# Patient Record
Sex: MALE | Race: WHITE | NOT HISPANIC OR LATINO | Employment: FULL TIME | URBAN - METROPOLITAN AREA
[De-identification: names, ages, dates, MRNs, and addresses within clinical notes are randomized per-mention and may not be internally consistent; named-entity substitution may affect disease eponyms.]

---

## 2017-01-27 ENCOUNTER — OFFICE VISIT (OUTPATIENT)
Dept: MEDICAL GROUP | Facility: MEDICAL CENTER | Age: 42
End: 2017-01-27
Payer: COMMERCIAL

## 2017-01-27 VITALS
BODY MASS INDEX: 31.37 KG/M2 | WEIGHT: 207 LBS | DIASTOLIC BLOOD PRESSURE: 76 MMHG | TEMPERATURE: 97.7 F | HEART RATE: 76 BPM | OXYGEN SATURATION: 97 % | SYSTOLIC BLOOD PRESSURE: 134 MMHG | HEIGHT: 68 IN

## 2017-01-27 DIAGNOSIS — Z00.00 PREVENTATIVE HEALTH CARE: Chronic | ICD-10-CM

## 2017-01-27 DIAGNOSIS — L98.9 SKIN LESION: ICD-10-CM

## 2017-01-27 PROCEDURE — 99213 OFFICE O/P EST LOW 20 MIN: CPT | Performed by: INTERNAL MEDICINE

## 2017-01-27 ASSESSMENT — PATIENT HEALTH QUESTIONNAIRE - PHQ9: CLINICAL INTERPRETATION OF PHQ2 SCORE: 0

## 2017-01-27 NOTE — PROGRESS NOTES
Subjective:      Cliff Gu is a 42 y.o. 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  0 - not at all  Feeling down, depressed , or hopeless? 0 - not at all  Patient Health Questionnaire Score: 0     If depressive symptoms identified deferred to follow up visit unless specifically addressed in assesment and plan.      Current Outpatient Prescriptions   Medication Sig Dispense Refill   • albuterol (VENTOLIN OR PROVENTIL) 108 (90 BASE) MCG/ACT Aero Soln inhalation aerosol Inhale 2 Puffs by mouth every 6 hours as needed for Shortness of Breath. 8.5 g 1     No current facility-administered medications for this visit.     Patient Active Problem List    Diagnosis Date Noted   • Status post medial meniscus repair 05/17/2015   • Preventative health care 10/21/2011   • Left knee pain 09/23/2009   • Dyslipidemia 09/23/2009   • Sleep apnea 09/23/2009   • Allergic rhinitis 09/23/2009   • Thyroglossal duct cyst 09/23/2009           Thyroglossal duct cyst    Sleep apnea  9/07 pma probably had sleep study positive for sleep apnea and declined treatment I do not have that result    Preventative health  11/6/10 tdap  10/11 vit d 24    Meniscus tear  5/5/15 x-ray right knee probable mild patellofemoral compartment osteoarthritis  5/17/15 MRI right knee large medial meniscus posterior horn tear, large para  "meniscal cyst 3.3 x 1.7 x 0.6 cm  5/28/15  orthopedic note, right meniscus tear, scheduled for repair  6/5/15  orthopedic operative note partial arthroscopy with medial meniscectomy right knee    Left knee pain  6/08 MRI left knee medial femoral condylar articular cartilage irregularity, no full thickness loss    Dyslipidemia  7/07 chol 225,trig 238,hdl 32,ldl 142  9/09 chol 226,trig 212,hdl 31,ldl 153  10/11 chol 223,trig 161,hdl 27,ldl 164  6/2/15 chol 235,trig 255,hdl 38,ldl 146    Allergic rhinitis  Last kenalog shot june 2008 9/09 kenalog 40 mg IM  9/13 kenalog 40 mg IM                ROS       Objective:     /76 mmHg  Pulse 76  Temp(Src) 36.5 °C (97.7 °F)  Ht 1.727 m (5' 8\")  Wt 93.895 kg (207 lb)  BMI 31.48 kg/m2  SpO2 97%     Physical Exam   Constitutional: He appears well-developed and well-nourished. No distress.   HENT:   Head: Normocephalic and atraumatic.   Eyes: Conjunctivae are normal. Right eye exhibits no discharge. Left eye exhibits no discharge.   Neck: No JVD present. No thyromegaly present.   Cardiovascular: Normal rate, regular rhythm and normal heart sounds.    Pulmonary/Chest: Effort normal. No respiratory distress. He has no wheezes.   Abdominal: He exhibits no distension.   Musculoskeletal: He exhibits no edema.   Skin: Skin is warm. He is not diaphoretic. No erythema.   Psychiatric: He has a normal mood and affect. His behavior is normal.   Nursing note and vitals reviewed.    Left frontal scalp flap tannish brown lesion 1 x 1 cm circular lesion          Assessment/Plan:        Assessment  #1 left frontal scalp 1 x 1 cm circular flat lesion not suspicious    #2 obesity BMI 31.4      Plan   #1 monitor skin lesion, notify us for changes in size, color, texture    #2 wear hat at all times outdoors    #3 use sunscreen    #4 diet, exercise, weight loss discussed, does not require nutrition consultation at this time    #5 follow-up for annual exam         "

## 2017-04-25 NOTE — MR AVS SNAPSHOT
"        Cliff Gu   2017 1:20 PM   Office Visit   MRN: [id number]    Department:  [facility]   Dept Phone:  [phone]    Description:  Male : 1975   Provider:  You Flynn M.D.           Allergies as of 2017     Allergen Noted Reactions    Other Environmental 2015       Seasonal grasses weeds      You were diagnosed with     Preventative health care   [[id number]]         Vital Signs     Blood Pressure Pulse Temperature Height Weight Body Mass Index    134/76 mmHg 76 36.5 °C (97.7 °F) 1.727 m (5' 8\") 93.895 kg (207 lb) 31.48 kg/m2    Oxygen Saturation Smoking Status                97% Never Smoker           Basic Information     Date Of Birth Sex Race Ethnicity Preferred Language    1975 Male White Non- English      Problem List              ICD-10-CM Priority Class Noted - Resolved    Left knee pain M25.562   2009 - Present    Dyslipidemia (Chronic) E78.5   2009 - Present    Sleep apnea G47.30   2009 - Present    Allergic rhinitis (Chronic)    2009 - Present    Thyroglossal duct cyst Q89.2   2009 - Present    Preventative health care (Chronic) Z00.00   10/21/2011 - Present    Status post medial meniscus repair (Chronic) Z98.890   2015 - Present      Health Maintenance        Date Due Completion Dates    IMM INFLUENZA (1) 2016 10/20/2011    IMM DTaP/Tdap/Td Vaccine (2 - Td) 2020            Current Immunizations     Influenza TIV (IM) 10/20/2011    Tdap Vaccine 2010 11:26 AM      Below and/or attached are the medications your provider expects you to take. Review all of your home medications and newly ordered medications with your provider and/or pharmacist. Follow medication instructions as directed by your provider and/or pharmacist. Please keep your medication list with you and share with your provider. Update the information when medications are discontinued, doses are changed, or new medications (including " over-the-counter products) are added; and carry medication information at all times in the event of emergency situations     Allergies:  OTHER ENVIRONMENTAL - (reactions not documented)               Medications  Valid as of: January 27, 2017 -  1:41 PM    Generic Name Brand Name Tablet Size Instructions for use    Albuterol Sulfate (Aero Soln) albuterol 108 (90 BASE) MCG/ACT Inhale 2 Puffs by mouth every 6 hours as needed for Shortness of Breath.        .                 Medicines prescribed today were sent to:     [facility] [address] [place] [address] - [id number] [facility]    [address] [facility] [place] [address]    Phone: [phone] Fax: [phone]    Open 24 Hours?: No      Medication refill instructions:       If your prescription bottle indicates you have medication refills left, it is not necessary to call your provider’s office. Please contact your pharmacy and they will refill your medication.    If your prescription bottle indicates you do not have any refills left, you may request refills at any time through one of the following ways: The online DaisyBill system (except Urgent Care), by calling your provider’s office, or by asking your pharmacy to contact your provider’s office with a refill request. Medication refills are processed only during regular business hours and may not be available until the next business day. Your provider may request additional information or to have a follow-up visit with you prior to refilling your medication.   *Please Note: Medication refills are assigned a new Rx number when refilled electronically. Your pharmacy may indicate that no refills were authorized even though a new prescription for the same medication is available at the pharmacy. Please request the medicine by name with the pharmacy before contacting your provider for a refill.        Your To Do List     Future Labs[facility]Procedures [facility] WITH  DIFFERENTIAL  As directed 1/28/2018    COMP METABOLIC PANEL  As directed 1/28/2018    LIPID PROFILE  As directed 1/28/2018    TSH  As directed 1/28/2018    URINALYSIS,CULTURE IF INDICATED  As directed 1/28/2018    VITAMIN D,25 HYDROXY  As directed 1/28/2018      Other Notes About Your Plan           Need all labs        Thyroglossal duct cyst    Sleep apnea  9/07 pma probably had sleep study positive for sleep apnea and declined treatment I do not have that result    Preventative health  11/6/10 tdap  10/11 vit d 24    Meniscus tear  5/5/15 x-ray right knee probable mild patellofemoral compartment osteoarthritis  5/17/15 MRI right knee large medial meniscus posterior horn tear, large para meniscal cyst 3.3 x 1.7 x 0.6 cm  5/28/15  orthopedic note, right meniscus tear, scheduled for repair  6/5/15  orthopedic operative note partial arthroscopy with medial meniscectomy right knee    Left knee pain  6/08 MRI left knee medial femoral condylar articular cartilage irregularity, no full thickness loss    Dyslipidemia  7/07 chol 225,trig 238,hdl 32,ldl 142  9/09 chol 226,trig 212,hdl 31,ldl 153  10/11 chol 223,trig 161,hdl 27,ldl 164  6/2/15 chol 235,trig 255,hdl 38,ldl 146    Allergic rhinitis  Last kenalog shot june 2008 9/09 kenalog 40 mg IM  9/13 kenalog 40 mg IM                         Astaro Access Code: [email or web address]  Expires: 2/26/2017  1:41 PM    Astaro  A secure, online tool to manage your health information     amiandos Astaro® is a secure, online tool that connects you to your personalized health information from the privacy of your home -- day or night - making it very easy for you to manage your healthcare. Once the activation process is completed, you can even access your medical information using the Astaro emil, which is available for free in the Apple Emil store or Google Play store.     Astaro provides the following levels of access (as shown below):   My Chart Features    [facility] Doctor [facility]  Specialists [facility]  Urgent  Care Non-[facility]  Primary Care  Doctor   Email your healthcare team securely and privately 24/7 X X X    Manage appointments: schedule your next appointment; view details of past/upcoming appointments X      Request prescription refills. X      View recent personal medical records, including lab and immunizations X X X X   View health record, including health history, allergies, medications X X X X   Read reports about your outpatient visits, procedures, consult and ER notes X X X X   See your discharge summary, which is a recap of your hospital and/or ER visit that includes your diagnosis, lab results, and care plan. X X       How to register for enVerid:  1. Go to  [email or web address].  2. Click on the Sign Up Now box, which takes you to the New Member Sign Up page. You will need to provide the following information:  a. Enter your enVerid Access Code exactly as it appears at the top of this page. (You will not need to use this code after you’ve completed the sign-up process. If you do not sign up before the expiration date, you must request a new code.)   b. Enter your date of birth.   c. Enter your home email address.   d. Click Submit, and follow the next screen’s instructions.  3. Create a enVerid ID. This will be your enVerid login ID and cannot be changed, so think of one that is secure and easy to remember.  4. Create a enVerid password. You can change your password at any time.  5. Enter your Password Reset Question and Answer. This can be used at a later time if you forget your password.   6. Enter your e-mail address. This allows you to receive e-mail notifications when new information is available in enVerid.  7. Click Sign Up. You can now view your health information.    For assistance activating your enVerid account, call [phone]         detailed exam PERRL/conjunctiva clear/EOMI

## 2019-02-28 ENCOUNTER — OFFICE VISIT (OUTPATIENT)
Dept: URGENT CARE | Facility: PHYSICIAN GROUP | Age: 44
End: 2019-02-28

## 2019-02-28 VITALS
TEMPERATURE: 98.1 F | BODY MASS INDEX: 34.53 KG/M2 | WEIGHT: 220 LBS | DIASTOLIC BLOOD PRESSURE: 80 MMHG | OXYGEN SATURATION: 96 % | SYSTOLIC BLOOD PRESSURE: 122 MMHG | HEART RATE: 75 BPM | HEIGHT: 67 IN | RESPIRATION RATE: 16 BRPM

## 2019-02-28 DIAGNOSIS — Z02.4 ENCOUNTER FOR COMMERCIAL DRIVING LICENSE (CDL) EXAM: Primary | ICD-10-CM

## 2019-02-28 PROCEDURE — 7100 PR DOT PHYSICAL: Performed by: NURSE PRACTITIONER

## 2019-02-28 NOTE — PROGRESS NOTES
"Subjective:     Cliff Gu is a 44 y.o. male who presents for Annual Exam (DOT )       Patient presents for a DOT physical exam. Refer to paper documentation scanned into electronic health record under \"Media.\"    PMH:  has a past medical history of High cholesterol and Unspecified disorder of thyroid.    MEDS:   Current Outpatient Prescriptions:   •  albuterol (VENTOLIN OR PROVENTIL) 108 (90 BASE) MCG/ACT Aero Soln inhalation aerosol, Inhale 2 Puffs by mouth every 6 hours as needed for Shortness of Breath. (Patient not taking: Reported on 2/28/2019), Disp: 8.5 g, Rfl: 1    ALLERGIES:   Allergies   Allergen Reactions   • Other Environmental      Seasonal grasses weeds     SURGHX:   Past Surgical History:   Procedure Laterality Date   • KNEE ARTHROSCOPY Right 6/5/2015    Procedure: KNEE ARTHROSCOPY;  Surgeon: Noah Howell M.D.;  Location: SURGERY [facility];  Service:    • PB EXCIS THYROID DUCT CYST/SINUS  1982     SOCHX:  reports that he has never smoked. He has never used smokeless tobacco. He reports that he drinks about 1.8 oz of alcohol per week . He reports that he does not use drugs.     FH: Reviewed with patient, not pertinent to this visit.     ROS reviewed. Refer to paper documentation scanned into electronic health record under \"Media.\"    Objective:     /80   Pulse 75   Temp 36.7 °C (98.1 °F) (Temporal)   Resp 16   Ht 1.702 m (5' 7\")   Wt 99.8 kg (220 lb)   SpO2 96%   BMI 34.46 kg/m²     Physical exam WNL. 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 Refer to paper documentation scanned into electronic health record under \"Media.\"  "

## 2022-03-31 ENCOUNTER — OFFICE VISIT (OUTPATIENT)
Dept: MEDICAL GROUP | Facility: MEDICAL CENTER | Age: 47
End: 2022-03-31
Payer: COMMERCIAL

## 2022-03-31 ENCOUNTER — HOSPITAL ENCOUNTER (OUTPATIENT)
Dept: LAB | Facility: MEDICAL CENTER | Age: 47
End: 2022-03-31
Attending: PHYSICIAN ASSISTANT
Payer: COMMERCIAL

## 2022-03-31 VITALS
HEART RATE: 68 BPM | TEMPERATURE: 98.3 F | WEIGHT: 236 LBS | DIASTOLIC BLOOD PRESSURE: 90 MMHG | SYSTOLIC BLOOD PRESSURE: 142 MMHG | OXYGEN SATURATION: 97 % | BODY MASS INDEX: 35.77 KG/M2 | HEIGHT: 68 IN

## 2022-03-31 DIAGNOSIS — Z80.0 FAMILY HISTORY OF COLON CANCER: ICD-10-CM

## 2022-03-31 DIAGNOSIS — Z00.00 HEALTHCARE MAINTENANCE: ICD-10-CM

## 2022-03-31 DIAGNOSIS — Z23 NEED FOR VACCINATION: ICD-10-CM

## 2022-03-31 DIAGNOSIS — E78.5 DYSLIPIDEMIA: ICD-10-CM

## 2022-03-31 DIAGNOSIS — R55 NEAR SYNCOPE: ICD-10-CM

## 2022-03-31 DIAGNOSIS — I10 PRIMARY HYPERTENSION: ICD-10-CM

## 2022-03-31 LAB
25(OH)D3 SERPL-MCNC: 29 NG/ML (ref 30–100)
ALBUMIN SERPL BCP-MCNC: 4.6 G/DL (ref 3.2–4.9)
ALBUMIN/GLOB SERPL: 1.7 G/DL
ALP SERPL-CCNC: 66 U/L (ref 30–99)
ALT SERPL-CCNC: 24 U/L (ref 2–50)
ANION GAP SERPL CALC-SCNC: 12 MMOL/L (ref 7–16)
AST SERPL-CCNC: 25 U/L (ref 12–45)
BASOPHILS # BLD AUTO: 1 % (ref 0–1.8)
BASOPHILS # BLD: 0.07 K/UL (ref 0–0.12)
BILIRUB SERPL-MCNC: 0.4 MG/DL (ref 0.1–1.5)
BUN SERPL-MCNC: 10 MG/DL (ref 8–22)
CALCIUM SERPL-MCNC: 9.6 MG/DL (ref 8.4–10.2)
CHLORIDE SERPL-SCNC: 103 MMOL/L (ref 96–112)
CHOLEST SERPL-MCNC: 296 MG/DL (ref 100–199)
CO2 SERPL-SCNC: 25 MMOL/L (ref 20–33)
CREAT SERPL-MCNC: 0.93 MG/DL (ref 0.5–1.4)
EOSINOPHIL # BLD AUTO: 0.28 K/UL (ref 0–0.51)
EOSINOPHIL NFR BLD: 4 % (ref 0–6.9)
ERYTHROCYTE [DISTWIDTH] IN BLOOD BY AUTOMATED COUNT: 41.1 FL (ref 35.9–50)
EST. AVERAGE GLUCOSE BLD GHB EST-MCNC: 108 MG/DL
FASTING STATUS PATIENT QL REPORTED: NORMAL
GFR SERPLBLD CREATININE-BSD FMLA CKD-EPI: 101 ML/MIN/1.73 M 2
GLOBULIN SER CALC-MCNC: 2.7 G/DL (ref 1.9–3.5)
GLUCOSE SERPL-MCNC: 99 MG/DL (ref 65–99)
HBA1C MFR BLD: 5.4 % (ref 4–5.6)
HCT VFR BLD AUTO: 46.6 % (ref 42–52)
HDLC SERPL-MCNC: 31 MG/DL
HGB BLD-MCNC: 16.3 G/DL (ref 14–18)
IMM GRANULOCYTES # BLD AUTO: 0.02 K/UL (ref 0–0.11)
IMM GRANULOCYTES NFR BLD AUTO: 0.3 % (ref 0–0.9)
LDLC SERPL CALC-MCNC: 229 MG/DL
LYMPHOCYTES # BLD AUTO: 2.32 K/UL (ref 1–4.8)
LYMPHOCYTES NFR BLD: 32.8 % (ref 22–41)
MCH RBC QN AUTO: 30.6 PG (ref 27–33)
MCHC RBC AUTO-ENTMCNC: 35 G/DL (ref 33.7–35.3)
MCV RBC AUTO: 87.6 FL (ref 81.4–97.8)
MONOCYTES # BLD AUTO: 0.58 K/UL (ref 0–0.85)
MONOCYTES NFR BLD AUTO: 8.2 % (ref 0–13.4)
NEUTROPHILS # BLD AUTO: 3.81 K/UL (ref 1.82–7.42)
NEUTROPHILS NFR BLD: 53.7 % (ref 44–72)
NRBC # BLD AUTO: 0 K/UL
NRBC BLD-RTO: 0 /100 WBC
PLATELET # BLD AUTO: 278 K/UL (ref 164–446)
PMV BLD AUTO: 9.1 FL (ref 9–12.9)
POTASSIUM SERPL-SCNC: 4.2 MMOL/L (ref 3.6–5.5)
PROT SERPL-MCNC: 7.3 G/DL (ref 6–8.2)
PSA SERPL-MCNC: 1.26 NG/ML (ref 0–4)
RBC # BLD AUTO: 5.32 M/UL (ref 4.7–6.1)
SODIUM SERPL-SCNC: 140 MMOL/L (ref 135–145)
TRIGL SERPL-MCNC: 180 MG/DL (ref 0–149)
TSH SERPL DL<=0.005 MIU/L-ACNC: 2.32 UIU/ML (ref 0.38–5.33)
WBC # BLD AUTO: 7.1 K/UL (ref 4.8–10.8)

## 2022-03-31 PROCEDURE — 85025 COMPLETE CBC W/AUTO DIFF WBC: CPT

## 2022-03-31 PROCEDURE — 99204 OFFICE O/P NEW MOD 45 MIN: CPT | Mod: 25 | Performed by: PHYSICIAN ASSISTANT

## 2022-03-31 PROCEDURE — 80061 LIPID PANEL: CPT

## 2022-03-31 PROCEDURE — 36415 COLL VENOUS BLD VENIPUNCTURE: CPT

## 2022-03-31 PROCEDURE — 84443 ASSAY THYROID STIM HORMONE: CPT

## 2022-03-31 PROCEDURE — 80053 COMPREHEN METABOLIC PANEL: CPT

## 2022-03-31 PROCEDURE — 83036 HEMOGLOBIN GLYCOSYLATED A1C: CPT

## 2022-03-31 PROCEDURE — 82306 VITAMIN D 25 HYDROXY: CPT

## 2022-03-31 PROCEDURE — 90471 IMMUNIZATION ADMIN: CPT | Performed by: PHYSICIAN ASSISTANT

## 2022-03-31 PROCEDURE — 84153 ASSAY OF PSA TOTAL: CPT

## 2022-03-31 PROCEDURE — 90715 TDAP VACCINE 7 YRS/> IM: CPT | Performed by: PHYSICIAN ASSISTANT

## 2022-03-31 SDOH — ECONOMIC STABILITY: TRANSPORTATION INSECURITY
IN THE PAST 12 MONTHS, HAS THE LACK OF TRANSPORTATION KEPT YOU FROM MEDICAL APPOINTMENTS OR FROM GETTING MEDICATIONS?: NO

## 2022-03-31 SDOH — ECONOMIC STABILITY: FOOD INSECURITY: WITHIN THE PAST 12 MONTHS, YOU WORRIED THAT YOUR FOOD WOULD RUN OUT BEFORE YOU GOT MONEY TO BUY MORE.: SOMETIMES TRUE

## 2022-03-31 SDOH — ECONOMIC STABILITY: FOOD INSECURITY: WITHIN THE PAST 12 MONTHS, THE FOOD YOU BOUGHT JUST DIDN'T LAST AND YOU DIDN'T HAVE MONEY TO GET MORE.: NEVER TRUE

## 2022-03-31 SDOH — ECONOMIC STABILITY: HOUSING INSECURITY
IN THE LAST 12 MONTHS, WAS THERE A TIME WHEN YOU DID NOT HAVE A STEADY PLACE TO SLEEP OR SLEPT IN A SHELTER (INCLUDING NOW)?: NO

## 2022-03-31 SDOH — HEALTH STABILITY: MENTAL HEALTH
STRESS IS WHEN SOMEONE FEELS TENSE, NERVOUS, ANXIOUS, OR CAN'T SLEEP AT NIGHT BECAUSE THEIR MIND IS TROUBLED. HOW STRESSED ARE YOU?: TO SOME EXTENT

## 2022-03-31 SDOH — ECONOMIC STABILITY: INCOME INSECURITY: HOW HARD IS IT FOR YOU TO PAY FOR THE VERY BASICS LIKE FOOD, HOUSING, MEDICAL CARE, AND HEATING?: SOMEWHAT HARD

## 2022-03-31 SDOH — HEALTH STABILITY: PHYSICAL HEALTH: ON AVERAGE, HOW MANY DAYS PER WEEK DO YOU ENGAGE IN MODERATE TO STRENUOUS EXERCISE (LIKE A BRISK WALK)?: 6 DAYS

## 2022-03-31 SDOH — HEALTH STABILITY: PHYSICAL HEALTH: ON AVERAGE, HOW MANY MINUTES DO YOU ENGAGE IN EXERCISE AT THIS LEVEL?: 30 MIN

## 2022-03-31 SDOH — ECONOMIC STABILITY: INCOME INSECURITY: IN THE LAST 12 MONTHS, WAS THERE A TIME WHEN YOU WERE NOT ABLE TO PAY THE MORTGAGE OR RENT ON TIME?: NO

## 2022-03-31 SDOH — ECONOMIC STABILITY: HOUSING INSECURITY: IN THE LAST 12 MONTHS, HOW MANY PLACES HAVE YOU LIVED?: 2

## 2022-03-31 SDOH — ECONOMIC STABILITY: TRANSPORTATION INSECURITY
IN THE PAST 12 MONTHS, HAS LACK OF TRANSPORTATION KEPT YOU FROM MEETINGS, WORK, OR FROM GETTING THINGS NEEDED FOR DAILY LIVING?: NO

## 2022-03-31 SDOH — ECONOMIC STABILITY: TRANSPORTATION INSECURITY
IN THE PAST 12 MONTHS, HAS LACK OF RELIABLE TRANSPORTATION KEPT YOU FROM MEDICAL APPOINTMENTS, MEETINGS, WORK OR FROM GETTING THINGS NEEDED FOR DAILY LIVING?: NO

## 2022-03-31 ASSESSMENT — SOCIAL DETERMINANTS OF HEALTH (SDOH)
HOW OFTEN DO YOU GET TOGETHER WITH FRIENDS OR RELATIVES?: ONCE A WEEK
WITHIN THE PAST 12 MONTHS, YOU WORRIED THAT YOUR FOOD WOULD RUN OUT BEFORE YOU GOT THE MONEY TO BUY MORE: SOMETIMES TRUE
HOW OFTEN DO YOU ATTEND CHURCH OR RELIGIOUS SERVICES?: 1 TO 4 TIMES PER YEAR
HOW MANY DRINKS CONTAINING ALCOHOL DO YOU HAVE ON A TYPICAL DAY WHEN YOU ARE DRINKING: 1 OR 2
HOW HARD IS IT FOR YOU TO PAY FOR THE VERY BASICS LIKE FOOD, HOUSING, MEDICAL CARE, AND HEATING?: SOMEWHAT HARD
HOW OFTEN DO YOU HAVE SIX OR MORE DRINKS ON ONE OCCASION: NEVER
IN A TYPICAL WEEK, HOW MANY TIMES DO YOU TALK ON THE PHONE WITH FAMILY, FRIENDS, OR NEIGHBORS?: ONCE A WEEK
DO YOU BELONG TO ANY CLUBS OR ORGANIZATIONS SUCH AS CHURCH GROUPS UNIONS, FRATERNAL OR ATHLETIC GROUPS, OR SCHOOL GROUPS?: NO
HOW OFTEN DO YOU HAVE A DRINK CONTAINING ALCOHOL: MONTHLY OR LESS
DO YOU BELONG TO ANY CLUBS OR ORGANIZATIONS SUCH AS CHURCH GROUPS UNIONS, FRATERNAL OR ATHLETIC GROUPS, OR SCHOOL GROUPS?: NO
HOW OFTEN DO YOU ATTENT MEETINGS OF THE CLUB OR ORGANIZATION YOU BELONG TO?: NEVER
HOW OFTEN DO YOU ATTENT MEETINGS OF THE CLUB OR ORGANIZATION YOU BELONG TO?: NEVER
HOW OFTEN DO YOU ATTEND CHURCH OR RELIGIOUS SERVICES?: 1 TO 4 TIMES PER YEAR
HOW OFTEN DO YOU GET TOGETHER WITH FRIENDS OR RELATIVES?: ONCE A WEEK
IN A TYPICAL WEEK, HOW MANY TIMES DO YOU TALK ON THE PHONE WITH FAMILY, FRIENDS, OR NEIGHBORS?: ONCE A WEEK

## 2022-03-31 ASSESSMENT — LIFESTYLE VARIABLES
HOW OFTEN DO YOU HAVE A DRINK CONTAINING ALCOHOL: MONTHLY OR LESS
HOW MANY STANDARD DRINKS CONTAINING ALCOHOL DO YOU HAVE ON A TYPICAL DAY: 1 OR 2
HOW OFTEN DO YOU HAVE SIX OR MORE DRINKS ON ONE OCCASION: NEVER

## 2022-03-31 NOTE — PROGRESS NOTES
"Subjective:   Cliff Gu is a 47 y.o. male here today for establishing care:    HPI    Patient is a pleasant 47-year-old male who comes in to establish care.  Has not been seen for a few years secondary to Covid pandemic.  Reports having a remote history of elevated cholesterol, obesity family history of colon cancer.  Dad had colon cancer diagnosed in his 70s.  Patient denies change in stool or blood or mucus in his stool.  Has noticed in the last 2 weeks he has had 2 episodes where he felt faint and that he was going to black out.  He sat down and symptoms resolved.  He notes that he has been very stressed with the selling of the property and has not been sleeping as well.  Since changing his diet and trying to increase fluids and sleep he has not had episodes occur.  Denies rapid heartbeat, irregular heartbeat sensation chest pain or shortness of breath.  Both episodes occurred while at rest      Current medicines (including changes today)  No current outpatient medications on file.     No current facility-administered medications for this visit.     He  has a past medical history of Allergic rhinitis (9/23/2009), High cholesterol, and Unspecified disorder of thyroid.  Other environmental     Social History and Family History were reviewed and updated.    ROS   No headaches, chest pain, no shortness of breath, abdominal pain, nausea, or vomiting.  All other systems were reviewed and are negative or noted as positive in the HPI.       Objective:     /90 (BP Location: Right arm, Patient Position: Sitting, BP Cuff Size: Large adult)   Pulse 68   Temp 36.8 °C (98.3 °F) (Temporal)   Ht 1.727 m (5' 8\")   Wt 107 kg (236 lb)   SpO2 97%  Body mass index is 35.88 kg/m².     Physical Exam:  Constitutional: ANO x3, no acute distress, well-nourished, well-hydrated   Skin: Warm, dry, good turgor, no rashes in visible areas.  HEENT: Is normocephalic atraumatic, good PERRLA, extraocular movements intact, " TMs and oropharynx are clear with good dentition   Neck: Soft and supple, trachea midline, no masses.  No thyroid megaly or cervical lymphadenopathy noted  Cardiovascular: Regular rate and rhythm.  Normal S1 and 2, no murmurs, rubs or gallops.  No edema noted  Lungs: Clear to auscultation bilaterally.  No wheezes, rales or rhonchi.  Good inspiratory and expiratory breath sounds  Abdomen: Soft, non-tender, no masses.  No hepatosplenomegaly.  Negative Reno's  Psych: Alert and oriented x3, normal affect and mood.  Neuro: Cranial nerves II through XII were assessed and intact.  Normal gait, normal cerebellar function noted  Musculoskeletal: Full range of motion, good strength against resistance    Clinical Course/Lab Analysis:    Pending    Assessment and Plan:   The following treatment plan was discussed.  Signs and symptoms for which to return were discussed with patient at length.  Patient verbalized understanding.    1. Need for vaccination  - Tdap Vaccine =>8YO IM    2. Near syncope  New and unstable: Unclear etiology.  May be related to dehydration or fatigue.  Will order some baseline labs and follow  - Comp Metabolic Panel; Future  - CBC WITH DIFFERENTIAL; Future  - TSH WITH REFLEX TO FT4; Future    3. Dyslipidemia  Chronic and unstable we will order labs.  Patient may need to be started on medication for lipid lowering  - LIPID PANEL    4. Family history of colon cancer  Chronic and unstable  Patient has family history of colon cancer with dad diagnosed in his 70s.  We will still set up for colon cancer screening at 50 or earlier as needed    5. Healthcare maintenance  - PROSTATE SPECIFIC AG SCREENING; Future  - VITAMIN D,25 HYDROXY; Future  - HEMOGLOBIN A1C; Future     7.  Obesity  Patient is changing his diet and lifestyle.  Discussed the importance of this at length      8. Hypertension  New and unstable  Will recheck in two weeks and if still elevated will recommend antihypertensives at next  visit    Followup: 2 weeks    Please note that this dictation was created using voice recognition software. I have made every reasonable attempt to correct obvious errors, but I expect that there are errors of grammar and possibly content that I did not discover before finalizing the note.

## 2022-04-12 ENCOUNTER — OFFICE VISIT (OUTPATIENT)
Dept: MEDICAL GROUP | Facility: MEDICAL CENTER | Age: 47
End: 2022-04-12
Payer: COMMERCIAL

## 2022-04-12 VITALS
SYSTOLIC BLOOD PRESSURE: 134 MMHG | HEIGHT: 68 IN | WEIGHT: 233 LBS | TEMPERATURE: 97.5 F | HEART RATE: 66 BPM | BODY MASS INDEX: 35.31 KG/M2 | OXYGEN SATURATION: 98 % | DIASTOLIC BLOOD PRESSURE: 74 MMHG

## 2022-04-12 DIAGNOSIS — R55 NEAR SYNCOPE: ICD-10-CM

## 2022-04-12 DIAGNOSIS — E66.9 OBESITY (BMI 30-39.9): ICD-10-CM

## 2022-04-12 DIAGNOSIS — Z91.89 OTHER SPECIFIED PERSONAL RISK FACTORS, NOT ELSEWHERE CLASSIFIED: ICD-10-CM

## 2022-04-12 DIAGNOSIS — E78.2 MIXED HYPERLIPIDEMIA: ICD-10-CM

## 2022-04-12 PROCEDURE — 99214 OFFICE O/P EST MOD 30 MIN: CPT | Performed by: PHYSICIAN ASSISTANT

## 2022-04-12 ASSESSMENT — FIBROSIS 4 INDEX: FIB4 SCORE: 0.88

## 2022-04-12 ASSESSMENT — PATIENT HEALTH QUESTIONNAIRE - PHQ9: CLINICAL INTERPRETATION OF PHQ2 SCORE: 0

## 2022-04-12 NOTE — ASSESSMENT & PLAN NOTE
Patient is a pleasant 47-year-old who comes in to go over labs that were drawn at last visit.  Notes that he understands he has high cholesterol and triglycerides.  He is trying to change his diet by doing keto diet.  He is also trying to lose weight.  He adamantly defers on any statin therapy.  Denies family history of heart disease

## 2022-04-12 NOTE — ASSESSMENT & PLAN NOTE
Since increasing fluid intake and getting more sleep he has had no near syncopal episodes since last appointment

## 2022-04-12 NOTE — PROGRESS NOTES
"Subjective:     Chief Complaint   Patient presents with   • Lab Results     Cliff Gu is a 47 y.o. male here today to follow up on:    Mixed hyperlipidemia  Patient is a pleasant 47-year-old who comes in to go over labs that were drawn at last visit.  Notes that he understands he has high cholesterol and triglycerides.  He is trying to change his diet by doing keto diet.  He is also trying to lose weight.  He adamantly defers on any statin therapy.  Denies family history of heart disease    Near syncope  Since increasing fluid intake and getting more sleep he has had no near syncopal episodes since last appointment    Obesity (BMI 30-39.9)  Patient is trying to lose weight via keto diet and exercise       Current medicines (including changes today)  No current outpatient medications on file.     No current facility-administered medications for this visit.     He  has a past medical history of Allergic rhinitis (9/23/2009), Dyslipidemia (9/23/2009), High cholesterol, and Unspecified disorder of thyroid.    ROS  No chest pain, no abdominal pain, no rash.  All other systems reviewed and are negative      Objective:     /74 (BP Location: Right arm, Patient Position: Sitting, BP Cuff Size: Large adult)   Pulse 66   Temp 36.4 °C (97.5 °F) (Temporal)   Ht 1.727 m (5' 8\")   Wt 106 kg (233 lb)   SpO2 98%  Body mass index is 35.43 kg/m².     Physical Exam:  Constitutional: Alert, no distress.  Skin: Warm, dry, good turgor, no rashes in visible areas.  Eye: Equal, round and reactive, conjunctiva clear, lids normal.  ENMT: Lips without lesions, good dentition, oropharynx clear.  Neck: Trachea midline, no masses, no thyromegaly. No cervical or supraclavicular lymphadenopathy.  Respiratory: Unlabored respiratory effort, lungs clear to auscultation, no wheezes, no ronchi.  Cardiovascular: Normal S1, S2, no murmur, no edema.  Abdomen: Soft, non-tender, no masses, no hepatosplenomegaly.  Psych: Alert and " oriented x3, normal affect and mood.        Assessment and Plan:   The following treatment plan was discussed and signs and symptoms for which to return were discussed with patient.  Patient verbalized understanding.    1. Mixed hyperlipidemia  Chronic and unstable.  Discussed with him that his ASCVD is 9.3% my recommendation is to start Crestor 10 mg.  He adamantly defers and understands risks of refusal.  He wants to recheck lipid every 3 months.  He will start exercising and is currently doing keto diet  - CRP HIGH SENSITIVE (CARDIAC); Future  - Lipoprotein (a); Future  - Lipid Profile; Future    2. Other specified personal risk factors, not elsewhere classified  New and unstable  - CT-HEART W/O CONT EVAL CALCIUM; Future    3. Obesity (BMI 30-39.9)  Neck and unstable  Spent a great deal of time discussing importance of diet lifestyle intervention  - Patient identified as having weight management issue.  Appropriate orders and counseling given.    4. Near syncope  Symptoms have resolved with increasing fluid intake and getting more sleep.       Followup: Return in about 3 months (around 7/12/2022).  To check cardiac markers and lipid panel       Please note that this dictation was created using voice recognition software. I have made every reasonable attempt to correct obvious errors, but I expect that there are errors of grammar and possibly content that I did not discover before finalizing the note.

## 2022-07-26 ENCOUNTER — APPOINTMENT (OUTPATIENT)
Dept: MEDICAL GROUP | Facility: MEDICAL CENTER | Age: 47
End: 2022-07-26
Payer: COMMERCIAL

## 2022-08-02 ENCOUNTER — HOSPITAL ENCOUNTER (OUTPATIENT)
Dept: LAB | Facility: MEDICAL CENTER | Age: 47
End: 2022-08-02
Attending: PHYSICIAN ASSISTANT
Payer: COMMERCIAL

## 2022-08-02 ENCOUNTER — HOSPITAL ENCOUNTER (OUTPATIENT)
Dept: RADIOLOGY | Facility: MEDICAL CENTER | Age: 47
End: 2022-08-02
Attending: PHYSICIAN ASSISTANT
Payer: COMMERCIAL

## 2022-08-02 DIAGNOSIS — E78.2 MIXED HYPERLIPIDEMIA: ICD-10-CM

## 2022-08-02 DIAGNOSIS — Z91.89 OTHER SPECIFIED PERSONAL RISK FACTORS, NOT ELSEWHERE CLASSIFIED: ICD-10-CM

## 2022-08-02 LAB
CHOLEST SERPL-MCNC: 318 MG/DL (ref 100–199)
CRP SERPL HS-MCNC: 10 MG/L (ref 0–3)
FASTING STATUS PATIENT QL REPORTED: NORMAL
HDLC SERPL-MCNC: 36 MG/DL
LDLC SERPL CALC-MCNC: 253 MG/DL
TRIGL SERPL-MCNC: 147 MG/DL (ref 0–149)

## 2022-08-02 PROCEDURE — 4410556 CT-CARDIAC SCORING (SELF PAY ONLY)

## 2022-08-02 PROCEDURE — 86141 C-REACTIVE PROTEIN HS: CPT

## 2022-08-02 PROCEDURE — 80061 LIPID PANEL: CPT

## 2022-08-02 PROCEDURE — 83695 ASSAY OF LIPOPROTEIN(A): CPT

## 2022-08-02 PROCEDURE — 36415 COLL VENOUS BLD VENIPUNCTURE: CPT

## 2022-08-04 ENCOUNTER — OFFICE VISIT (OUTPATIENT)
Dept: MEDICAL GROUP | Facility: MEDICAL CENTER | Age: 47
End: 2022-08-04
Payer: COMMERCIAL

## 2022-08-04 VITALS
SYSTOLIC BLOOD PRESSURE: 132 MMHG | OXYGEN SATURATION: 100 % | BODY MASS INDEX: 32.28 KG/M2 | TEMPERATURE: 96.9 F | DIASTOLIC BLOOD PRESSURE: 75 MMHG | WEIGHT: 213 LBS | HEART RATE: 64 BPM | HEIGHT: 68 IN

## 2022-08-04 DIAGNOSIS — R79.82 ELEVATED C-REACTIVE PROTEIN (CRP): ICD-10-CM

## 2022-08-04 DIAGNOSIS — E66.9 OBESITY (BMI 30-39.9): ICD-10-CM

## 2022-08-04 DIAGNOSIS — E78.2 MIXED HYPERLIPIDEMIA: ICD-10-CM

## 2022-08-04 LAB — LPA SERPL-MCNC: <6 MG/DL

## 2022-08-04 PROCEDURE — 99214 OFFICE O/P EST MOD 30 MIN: CPT | Performed by: PHYSICIAN ASSISTANT

## 2022-08-04 ASSESSMENT — FIBROSIS 4 INDEX: FIB4 SCORE: 0.88

## 2022-08-04 NOTE — PROGRESS NOTES
"Subjective:     Chief Complaint   Patient presents with   • Follow-Up     Cliff Gu is a 47 y.o. male here today to follow to Discuss:    No problem-specific Assessment & Plan notes found for this encounter.       Current medicines (including changes today)  No current outpatient medications on file.     No current facility-administered medications for this visit.     He  has a past medical history of Allergic rhinitis (9/23/2009), Dyslipidemia (9/23/2009), High cholesterol, and Unspecified disorder of thyroid.    ROS  As per listed in the HPI, otherwise negative     Objective:     /75 (BP Location: Right arm, Patient Position: Sitting, BP Cuff Size: Adult)   Pulse 64   Temp 36.1 °C (96.9 °F) (Temporal)   Ht 1.727 m (5' 8\")   Wt 96.6 kg (213 lb)   SpO2 100%  Body mass index is 32.39 kg/m².     Physical Exam:  General: Patient appears well-nourished, well-hydrated, nontoxic  HEENT, normocephalic atraumatic, PERRLA, extraocular movements intact, nares are patent and clear  Neck: No visible masses, thyromegaly or abnormalities noted  Cardiovascular.  Sitting comfortably without visible signs of edema  Lungs: No cyanosis noted, nondyspneic  Skin: Well perfused without evidence of rash or lesions  Neurological: Cranial nerves II through XII intact, normal gait  Musculoskeletal: Normal range of motion, normal strength and no deficit noted       Assessment and Plan:   The following treatment plan was discussed and signs and symptoms for which to return were discussed with patient.  Patient verbalized understanding.    There are no diagnoses linked to this encounter.   1. Mixed hyperlipidemia  Chronic and unstable.  Discussed with him that his ASCVD is 9.3% my recommendation is to start Crestor 10 mg.  He adamantly defers and understands risks of refusal.  He wants to recheck lipid every 3 months.  He will start exercising and is currently doing keto diet  - CRP HIGH SENSITIVE (CARDIAC); Future  - " Lipoprotein (a); Future  - Lipid Profile; Future     2. Other specified personal risk factors, not elsewhere classified  New and unstable  - CT-HEART W/O CONT EVAL CALCIUM; Future     3. Obesity (BMI 30-39.9)  Neck and unstable  Spent a great deal of time discussing importance of diet lifestyle intervention  - Patient identified as having weight management issue.  Appropriate orders and counseling given.     4. Near syncope  Symptoms have resolved with increasing fluid intake and getting more sleep.       Followup: No follow-ups on file.         Please note that this dictation was created using voice recognition software. I have made every reasonable attempt to correct obvious errors, but I expect that there are errors of grammar and possibly content that I did not discover before finalizing the note.

## 2022-10-17 ENCOUNTER — OFFICE VISIT (OUTPATIENT)
Dept: VASCULAR LAB | Facility: MEDICAL CENTER | Age: 47
End: 2022-10-17
Attending: FAMILY MEDICINE
Payer: COMMERCIAL

## 2022-10-17 VITALS
BODY MASS INDEX: 32.15 KG/M2 | WEIGHT: 204.8 LBS | HEART RATE: 93 BPM | DIASTOLIC BLOOD PRESSURE: 77 MMHG | SYSTOLIC BLOOD PRESSURE: 127 MMHG | HEIGHT: 67 IN

## 2022-10-17 DIAGNOSIS — E78.00 PRIMARY HYPERCHOLESTEROLEMIA: ICD-10-CM

## 2022-10-17 PROCEDURE — 99212 OFFICE O/P EST SF 10 MIN: CPT

## 2022-10-17 PROCEDURE — 99214 OFFICE O/P EST MOD 30 MIN: CPT | Performed by: FAMILY MEDICINE

## 2022-10-17 ASSESSMENT — ENCOUNTER SYMPTOMS
WEAKNESS: 0
BRUISES/BLEEDS EASILY: 0
CHILLS: 0
CLAUDICATION: 0
COUGH: 0
FEVER: 0
MYALGIAS: 0
PALPITATIONS: 0
ORTHOPNEA: 0
NAUSEA: 0

## 2022-10-17 ASSESSMENT — FIBROSIS 4 INDEX: FIB4 SCORE: 0.88

## 2022-10-17 NOTE — PROGRESS NOTES
[facility] Initial Visit  Date of Service: 10/17/22     Cliff Gu has been referred for evaluation and management of dyslipidemia    Referral Source: Clementine Hinds P.A.-C.     Subjective   Current/interval symptoms or concerns:  prior with PCP and advised for statin therapy but has declined and completed additional risk analysis.  Feeling well on current diet plan, no prior ascvd or other sx.    Change in weight:  improving   BMI Readings from Last 5 Encounters:   10/17/22 32.08 kg/m²   08/04/22 32.39 kg/m²   04/12/22 35.43 kg/m²   03/31/22 35.88 kg/m²   02/28/19 34.46 kg/m²     Exercise habits: strenuous regular exercise program  Dietary patterns: high protein, lower fat, limited CHO - avoid seed oils  Etoh: No  Reported barriers to care: none  History of ASCVD: None  Secondary causes of dyslipidemia:  Endocrine/Hypothyroidism:  none reported   Liver disease: none reported   Renal disease/nephrotic syndrome:  none reported  Medications: None  Other Established (non-atherosclerotic) Vascular Disease, if Present: None  Age at Initial Diagnosis of Dyslipidemia: at least age 34   Current Prescription Lipid Lowering Medications - including dose:   Statin: None  Non-Statin: None  Current Lipid Lowering and Related Supplements: None  Any Current Side Effects Potentially Related to Lipid Lowering therapy? No  Current Adherence to Lipid Lowering Therapies: Complete  Previously Attempted Interventions for Lipids - including outcome  Statin: None   Outcome: N/A  Non-Statin: None  Outcome: N/A  Any Previous History of Statin Intolerance? No  Baseline Lipids Prior to Treatment:   Lab Results   Component Value Date/Time    CHOLSTRLTOT 318 (H) 08/02/2022 09:06 AM     (H) 08/02/2022 09:06 AM    HDL 36 (A) 08/02/2022 09:06 AM    TRIGLYCERIDE 147 08/02/2022 09:06 AM         Anticoagulation/antiplats: No    HTN: no prior      T2D: No     PAST MEDICAL HISTORY:  has a past medical history of Allergic  "rhinitis (9/23/2009), Dyslipidemia (9/23/2009), High cholesterol, and Unspecified disorder of thyroid.    PAST SURGICAL HISTORY:  has a past surgical history that includes pr excis thyroid duct cyst/sinus (1982) and knee arthroscopy (Right, 6/5/2015).    No current outpatient medications on file.    ALLERGIES: Other environmental    Family History   Problem Relation Age of Onset    Hypertension Mother     Hyperlipidemia Father     No Known Problems Sister     Stroke Paternal Grandfather         70s    Diabetes Other     Stroke Other     Cancer Other        Social History     Tobacco Use    Smoking status: Never    Smokeless tobacco: Never   Substance Use Topics    Alcohol use: Yes     Alcohol/week: 1.8 oz     Types: 3 Standard drinks or equivalent per week    Drug use: No       Review of Systems   Constitutional:  Negative for chills and fever.   Respiratory:  Negative for cough.    Cardiovascular:  Negative for chest pain, palpitations, orthopnea, claudication and leg swelling.   Gastrointestinal:  Negative for nausea.   Musculoskeletal:  Negative for myalgias.   Neurological:  Negative for weakness.   Endo/Heme/Allergies:  Does not bruise/bleed easily.       Objective    Vitals:    10/17/22 1026 10/17/22 1029   BP: 136/76 127/77   BP Location: Left arm Left arm   Patient Position: Sitting Sitting   BP Cuff Size: Adult Adult   Pulse: 99 93   Weight: 92.9 kg (204 lb 12.8 oz)    Height: 1.702 m (5' 7\")       BP Readings from Last 5 Encounters:   10/17/22 127/77   08/04/22 132/75   04/12/22 134/74   03/31/22 142/90   02/28/19 122/80     Physical Exam  Constitutional:       General: He is not in acute distress.     Appearance: He is well-developed. He is not diaphoretic.   HENT:      Head: Normocephalic.   Eyes:      Conjunctiva/sclera: Conjunctivae normal.   Pulmonary:      Effort: Pulmonary effort is normal. No respiratory distress.   Skin:     Coloration: Skin is not pale.      Findings: No rash.   Neurological:     "  Mental Status: He is alert and oriented to person, place, and time.      Cranial Nerves: No cranial nerve deficit.   Psychiatric:         Behavior: Behavior normal.       DATA REVIEW:  Most Recent Lipid Panel:   Lab Results   Component Value Date    CHOLSTRLTOT 318 (H) 08/02/2022    TRIGLYCERIDE 147 08/02/2022    HDL 36 (A) 08/02/2022     (H) 08/02/2022     Lab Results   Component Value Date/Time    LIPOPROTA <6 08/02/2022 09:06 AM      No results found for: APOB       Latest Reference Range & Units 9/24/09 00:00 10/20/11 12:16 6/2/15 09:44 3/31/22 10:56 8/2/22 09:06   Cholesterol,Tot 100 - 199 mg/dL 226 (H) 223 (H) 235 (H) 296 (H) 318 (H)   Triglycerides 0 - 149 mg/dL 212 (H) 161 (H) 255 (H) 180 (H) 147   HDL >=40 mg/dL 31 (L) 27 (L) 38 ! 31 ! 36 !   LDL <100 mg/dL 153 (H) 164 146 (H) 229 (H) 253 (H)   Chol-Hdl Ratio 0.0 - 5.0 ratio units 7.3 (H)       VLDL Cholesterol Calc 5 - 40 mg/dL 42 (H)         Other Pertinent Blood Work:   Lab Results   Component Value Date    SODIUM 140 03/31/2022    POTASSIUM 4.2 03/31/2022    CHLORIDE 103 03/31/2022    CO2 25 03/31/2022    ANION 12.0 03/31/2022    GLUCOSE 99 03/31/2022    BUN 10 03/31/2022    CREATININE 0.93 03/31/2022    CALCIUM 9.6 03/31/2022    ASTSGOT 25 03/31/2022    ALTSGPT 24 03/31/2022    ALKPHOSPHAT 66 03/31/2022    TBILIRUBIN 0.4 03/31/2022    ALBUMIN 4.6 03/31/2022    AGRATIO 1.7 03/31/2022    LIPOPROTA <6 08/02/2022    TSHULTRASEN 2.320 03/31/2022     VASCULAR IMAGING:     Last EKG: No results found for this or any previous visit.    CAC 8/2/22  Coronary calcification:  LMA - 0.0  LCX - 0.0  LAD - 0.0  RCA - 0.0  PDA - 0.0   Total Calcium Score: 0.0   Percentile: Calcium score is below the 50th percentile for the patient's age and sex.   Other findings:  Heart: Normal size.  Lungs: Clear.  Mediastinum: Normal.  Upper abdomen: Normal.          ASSESSMENT AND PLAN  1. Primary hypercholesterolemia  Comp Metabolic Panel    LipoFit by NMR          Patient  Type, check all that apply: Primary Prevention and Metabolic Syndrome    Established Atherosclerotic Cardiovascular Disease (ASCVD): no    Other Established (non-atherosclerotic) Vascular Disease, if Present: None    Evidence of Heterozygous Familial Hypercholesterolemia (FH): Yes   Baseline LDL-C = 253 - made aware could be primary severe HLD vs polygenic vs monogenic FH with increased risks of 2-fold to 22-fold increased risk for premature ASCVD based upon type of HLD  FH genotyping:  recommend to complete [facility] [place] [facility] -  our worker was out of office today, so provided pamphlet and number to call to get testing  Benefits/limitations/risks for dyslipidemic genetic testing reviewed.  Further resources for review available at [email or web address] site and in patient care instructions     ACC/AHA Indication for Statin Therapy, gabby all that apply:  LDL-C at baseline >190 mg/dl: Indication for High intensity statin   Primary Severe HLD / FH (baseline LDL-C >190)    Calculated Risk for ASCVD, if applicable  - not accurate due to  LDL-C >190 baseline    Other Significant Risk Markers, if any, gabby all that apply   CAC = 0 (8/2022) - though does not preclude use of med mgmt in LDL >190, does indicate less intensive therapy needed (such as mod-intensity statin vs high-intensity).  Does not preclude the presence of more acute, non-calcified plaque.   - recommend repeat 3-5yrs to monitor     Lp(a) <6 - favorable     - recommend for NMR analysis to inform LDL particle size and assist with tx decisions and risk refinement prior to next visit     Risk-enhancers: N/A (baseline LDL >190, precludes need for risk-enhancers)     Goal LDL-C and nonHDL-C based on Clinic Protocol  LDL-C <100 (consider non-HDL-C <130, apoB <90)  At goal? No, 8/2022    LIFESTYLE INTERVENTIONS:    SMOKING:   reports that he has never smoked. He has never used smokeless tobacco.   - continued complete avoidance of all tobacco products     PHYSICAL  ACTIVITY: continue healthy activity to improve CV fitness.  In general, targeting >150min/week of moderate-level activity.      WEIGHT MANAGEMENT AND NUTRITION: Mediterranean style dietary approach  and Provide specific dietary approach handouts, d/c keto style due to worsening LDL-C     LIPID LOWERING MEDICATION MANAGEMENT:     Statin Therapy Recommendations from Today’s Visit:   Reviewed risks/benefits/alternatives for lipid-lowering therapies, mercedes in relation to tx  of possible FH (poly vs monogenic) vs severe primary hyperchol and impact on CV risk reduction with 22% relative risk reduction per 38pts LDL reduction.  Through shared decision-making we have opted to hold medications per pt preferences.      Non-Statin Medications Recommendations from Today’s Visit:   Zetia: not currently indicated   Nexletol (avoid simva >20, prava >40): not currently indicated   BAS: not currently indicated   Rx-grade Omega-3 FA: not currently indicated   Fibrates:  not indicated     Indication for PCSK9 Inhibitor, if applicable:  Not currently indicated unless failed max christin statin with zetia or testing positive for monogenic FH     Supplements Recommended at this visit: None , could consider plant sterol/stanol and/or berberine     RECOMMENDATIONS FOR OTHER CV RISK FACTORS:    1) BLOOD PRESSURE MANAGEMENT:  ACC/AHA (2017) goal <130/80  Home BP at goal: yes  Office BP at goal:  yes   Plan:   - continue healthy diet, activity, weight mgmt   Monitoring:   - routine clinic-based BP measurements at least once annually   Medications: no meds indicated at this time      2) Glycemic Status: Normal    ANTITHROMBOTIC THERAPY None    OTHER ISSUES: none     Studies Ordered at Todays Visit: consider repeat CAC in 3-5 yrs   Blood Work Ordered At Today’s visit: as noted above   Follow-Up: 3 months    Total time: 51min - chart review/prep, review of other providers' records, imaging/lab review, face-to-face time for history/examination,  ordering, prescribing,  review of results/meds/ treatment plan with patient/family/caregiver, documentation in EMR, care coordination (as needed)     Tino Sanders M.D.  [facility], Board-certified clinical lipidologist   [facility]   [facility]   [phone]     CC:  HARDIK Noonan Kristen, P.A.-C.

## 2022-10-17 NOTE — PATIENT INSTRUCTIONS
Please contact [facility] project to schedule FH testing     Indications and rationale for dyslipidemia genetic testing:  -Strong clinical suspicion of a genetic dyslipidemia.  - assist with definitive diagnosis and aid in treatment decision-making  -Strong family history of dyslipidemia or its complications.  -Presence of related syndromic features  -Evidence that testing might .  -Available and effective early interventions exist.  -Eligibility for new or investigational drugs.  -Patient preference.  -Family planning  - informing need for cascade screening of family members     Benefits include:   -Confirmation of a clinical diagnosis of FH, especially in cases where it is not clear whether the person has FH or not.  -Provides more information about one’s risk or diagnosis, since not all individuals with FH present the same.  -Often results in initiation and intensification of therapy by a healthcare provider. Studies have also shown that individuals with FH are more willing to start, intensify or continue taking prescribed medications when given genetic confirmation.  -Provides information regarding why a healthy lifestyle and diet have not been able to control cholesterol levels on their own.  -Helps other family members to be screened.  -Determines whether or not FH has been passed down to a child, since everyone with -FH has a 50% chance of doing so.    Limitations include:   -It does not always provide a simple “yes” or “no” answer about FH.  -A negative test result does not always mean someone does not have FH--it simply means that their genetic cause(s) were not identified with current knowledge and genetic testing technologies. About 30-40% of people with clinically diagnosed FH may test negative. These results may be “false negatives” or the person might have a gene variant that has not yet been identified to be pathogenic, or disease causing. They may also have polygenic  hypercholesterolemia.  -Testing can be expensive if not covered by insurance, although financial aid and low-cost options may be available.  -Privacy and discrimination concerns. While the Genetic Information Non-discrimination Act (PENNIE) protects most people from discrimination when it comes to employment or health insurance coverage, it does not protect everyone or address other kinds of insurance, such as life, disability, or long-term care insurance.  -You should understand these limitations and carefully consider what it might mean for you and your children before you undergo genetic testing. These concerns may also apply  for other kinds of test results such as lipid panels.    Genetic Testing, Privacy, and Discrimination Protection  Your genetic information cannot be used in employment decisions or to determine eligibility for health insurance, thanks to the Genetic Information Nondiscrimination Act of 2008 (PENNIE).   -However, PENNIE does not apply to companies with fewer than 15 employees, to families covered by  health services, the [employer], [facility], or to decisions related to life, long-term care, or disability insurance.   -Federal employees covered by the Federal Employees Health Benefit Plan are not covered by PENNIE, but are protected by an Executive Order.   -Any of these protections can be reversed if PENNIE or the Executive Order are reversed. -If you feel you have been discriminated against, your option is to complain to the State  for health insurance or the Equal Employment Opportunity Commission for employment discrimination. Learn more about PENNIE and protection against genetic discrimination.

## 2023-01-19 ENCOUNTER — DOCUMENTATION (OUTPATIENT)
Dept: VASCULAR LAB | Facility: MEDICAL CENTER | Age: 48
End: 2023-01-19
Payer: COMMERCIAL

## 2023-01-19 NOTE — PROGRESS NOTES
Cliff Chávez showed to follow-up with the vascular medicine clinic. Scheduling staff will contact to reschedule and remind patient of the importance of maintaining appointments as scheduled or to contact our office at least 24 hours in advance if they need to reschedule.     Patient should be aware of the potential for worsening conditions without appropriate follow-up and monitoring.      [facility] Clinic   [facility]   [phone]